# Patient Record
(demographics unavailable — no encounter records)

---

## 2024-10-22 NOTE — DISCUSSION/SUMMARY
[] : The components of the vaccine(s) to be administered today are listed in the plan of care. The disease(s) for which the vaccine(s) are intended to prevent and the risks have been discussed with the caretaker.  The risks are also included in the appropriate vaccination information statements which have been provided to the patient's caregiver.  The caregiver has given consent to vaccinate. [FreeTextEntry1] : 2 day old F here to establish care. Appropriate weight change since birth. Diet, elimination and safety appropriate.  Baby breast-feeding, supplemented with Enfamil formula.  PE-WNL. Growth parameters WNL. No jaundice.  TcB today @ 48 hol, 9.5, phototherapy threshold .  Hep B received. Passed hearing exam. Passed CCHD screen.  Maternal depression screen negative.   Plan: - AG/RC provided - F/U [place] Screen # [id number], G6PD - Start Poly-vi-sol or Vitamin D daily - RTC in 1 week for  follow up and weight check - RTC for 1 month for HCM -  rectal thermometer prescribed   Anticipatory Guidance and Routine Care If infant is increasingly yellow (jaundiced), fever >100.4F, changes in activity, voids or feeding, to seek immediate medical attention and go the ED. [place] Transition: back to sleep, daily routines, calming techniques [place] Care: emergency preparedness plan, frequent hand washing, avoid direct sun exposure, expect 6-8 wet diapers/day, umbilical care reviewed Nutritional Adequacy: breastfeeding (Vitamin D supplement), iron fortified formula (if not ), no solid foods, no honey Parental Well Being: baby blues, accept help, sleep when baby sleeps, unwanted advice Safety: car seat safety, smoke free environment, no shaking, keep water heater temperature maximum 120 degrees F, active smoke and carbon monoxide detectors, crib safety   Caretaker expressed understanding of the plan and agrees. No other concerns or questions today.

## 2024-10-22 NOTE — HISTORY OF PRESENT ILLNESS
[Breast milk] : breast milk [Normal] : Normal [In Bassinet/Crib] : sleeps in bassinet/crib [On back] : sleeps on back [No] : No cigarette smoke exposure [Carbon Monoxide Detectors] : Carbon monoxide detectors at home [Smoke Detectors] : Smoke detectors at home. [Hepatitis B Vaccine Given] : Hepatitis B vaccine given [NO] : No [Vitamins ___] : Patient takes no vitamins [Co-sleeping] : no co-sleeping [Exposure to electronic nicotine delivery system] : No exposure to electronic nicotine delivery system [FreeTextEntry1] : 2 day old F presenting for  first well visit.    Hospital Course - [facility] Term male infant born at 38 weeks via  to a 34 yo  mother. Apgars were 9 and 9 at 1 and 5 minutes respectively. Infant was AGA. Hepatitis B vaccine was given. Passed hearing B/L. TCB at 23.5hrs was 7.3, phototherapy threshold 12.3. Prenatal labs were as follows: HIV negative on 22, RPR negative on 22, HBsAg negative on 22, Intrapartum RPR NR on 22, Rubella immune and GBS positive with inadequate treatment. Baby was admitted to observation nursery and then downgraded to well baby nursery after minimum 24 hours monitoring for signs of GBS sepsis. Maternal blood type A+. Congenital heart disease screening was passed. Maternal Covid negative. [facility]  Screening #[id number].   Birth Weight: Birth Length: Head Circumference: Discharge Weight:   New Patient History FHx: mom (none), dad (none) SHx: lives at home with parents (), mother (job), father (job), sibs, no pets, no smokers, no guns at home

## 2024-10-24 NOTE — HISTORY OF PRESENT ILLNESS
[FreeTextEntry6] : here for bili check breastfeeding, not yet on vitamins otherwise acting at baseline

## 2024-10-26 NOTE — DISCUSSION/SUMMARY
[FreeTextEntry1] : 9d/oex 37wk F presenting for bili check.  TcB ____ @ ___ hol , serum confirmation threshold_____, PT _____ RTC for 1 week weight check  - Discussed with guardians triple feedings (breast, expressed milk, offer formula). Monitor urine output and stool color. Indirect sunlight exposure 10-15 minutes.  - Discussed STRICT precautions for seeking immediate medical attention including but not limited to fever of 100.4F or more, yellowing or increased yellowing of skin or eyes, redness, discharge or foul odor from umbilical stump, poor feeding, lethargy or decreased responsiveness, fast or labored breathing, less than 5 wet diapers daily, rash or any other concerning sign or symptom.  Caretaker verbalized understanding of the aforementioned plan above. Caregiver in agreement of the plan. All questions answered.

## 2024-10-26 NOTE — HISTORY OF PRESENT ILLNESS
[de-identified] : bili check [FreeTextEntry6] : 9d/o ex37 wk F presenting for bili check.  Born 10/17 @ 7:46am.  BF 20 min each side on demand usually about q 1-2 and vitamin.  Voiding and stooling at baseline.

## 2024-11-05 NOTE — HISTORY OF PRESENT ILLNESS
[FreeTextEntry6] : bili check today noticed a lump in her belly button area Good activity, hydration and po intake. Acting at baseline otherwise.

## 2024-11-05 NOTE — PHYSICAL EXAM
[Db: ____] : Db [unfilled] [Normal External Genitalia] : normal external genitalia [NL] : warm, clear [FreeTextEntry9] : umbilical granuloma

## 2024-11-05 NOTE — DISCUSSION/SUMMARY
[FreeTextEntry1] : rhett @ [facility], 13, PT 20.9, SBT 15 much improved jaundice and hyperbilirubinemia  Umbilical granuloma: Silver nitrate procedure completed and tolerated well. Reviewed with parents regarding safe post umbilical cord fall care and management  Caretaker expressed understanding of the plan and agrees. No other concerns or questions today.

## 2024-11-12 NOTE — DISCUSSION/SUMMARY
[FreeTextEntry1] : SIGRID  is here for follow up  weight check. Appropriate weight gain since birth.  screen result pending.    Plan - Follow up for 1 mo hcm - Continue routine care - Continue with PVS or vitamin D drops if breastfeeding   Discussed STRICT precautions for seeking immediate medical attention including but not limited to fever of 100.4F or more, yellowing or increased yellowing of skin or eyes, redness, discharge or foul odor from umbilical stump, poor feeding, lethargy or decreased responsiveness, fast or labored breathing, less than 5 wet diapers daily, rash or any other concerning sign or symptom.   Caretaker verbalized understanding of the aforementioned plan above. Caregiver in agreement of the plan. All questions answered.

## 2024-11-12 NOTE — PHYSICAL EXAM
[Db: ____] : Db [unfilled] [Normal External Genitalia] : normal external genitalia [NL] : warm, clear [FreeTextEntry5] : mild scleral icterus [FreeTextEntry9] : well healing umbilical stump

## 2024-11-12 NOTE — HISTORY OF PRESENT ILLNESS
[FreeTextEntry6] : follow up  weight check.   Breastfeeding on demand. Sleeping and voiding appropriately. Taking vitamins as directed.

## 2024-11-18 NOTE — HISTORY OF PRESENT ILLNESS
[Mother] : mother [Breast milk] : breast milk [Vitamins ___] : Patient takes [unfilled] vitamins daily [Normal] : Normal [In Bassinet/Crib] : sleeps in bassinet/crib [On back] : sleeps on back [Co-sleeping] : no co-sleeping [No] : No cigarette smoke exposure [Exposure to electronic nicotine delivery system] : No exposure to electronic nicotine delivery system [Rear facing car seat in back seat] : Rear facing car seat in back seat [Carbon Monoxide Detectors] : Carbon monoxide detectors at home [Smoke Detectors] : Smoke detectors at home. [FreeTextEntry7] : Doing well - no major hospitalizations or ED visits  - no fevers, doing well  [NO] : No

## 2024-11-18 NOTE — DISCUSSION/SUMMARY
[Normal Growth] : growth [Normal Development] : development  [No Elimination Concerns] : elimination [Continue Regimen] : feeding [No Skin Concerns] : skin [Normal Sleep Pattern] : sleep [None] : no medical problems [Anticipatory Guidance Given] : Anticipatory guidance addressed as per the history of present illness section [Parental Well-Being] : parental well-being [Family Adjustment] : family adjustment [Feeding Routines] : feeding routines [Infant Adjustment] : infant adjustment [Safety] : safety [No Medications] : ~He/She~ is not on any medications [Parent/Guardian] : Parent/Guardian [Parental Concerns Addressed] : Parental concerns addressed [de-identified] : Maternal paperwork for maternal brace

## 2024-11-18 NOTE — PHYSICAL EXAM
[Alert] : alert [Acute Distress] : no acute distress [Normocephalic] : normocephalic [Flat Open Anterior [place]] : flat open anterior fontanelle [PERRL] : PERRL [Red Reflex Bilateral] : red reflex bilateral [Normally Placed Ears] : normally placed ears [Auricles Well Formed] : auricles well formed [Clear Tympanic membranes] : clear tympanic membranes [Light reflex present] : light reflex present [Bony landmarks visible] : bony landmarks visible [Discharge] : no discharge [Nares Patent] : nares patent [Palate Intact] : palate intact [Uvula Midline] : uvula midline [Supple, full passive range of motion] : supple, full passive range of motion [Palpable Masses] : no palpable masses [Symmetric Chest Rise] : symmetric chest rise [Clear to Auscultation Bilaterally] : clear to auscultation bilaterally [Regular Rate and Rhythm] : regular rate and rhythm [S1, S2 present] : S1, S2 present [Murmurs] : no murmurs [+2 Femoral Pulses] : +2 femoral pulses [Soft] : soft [Tender] : nontender [Distended] : not distended [Bowel Sounds] : bowel sounds present [Hepatomegaly] : no hepatomegaly [Splenomegaly] : no splenomegaly [Normal external genitailia] : normal external genitalia [Clitoromegaly] : no clitoromegaly [Patent Vagina] : vagina patent [Normally Placed] : normally placed [No Abnormal Lymph Nodes Palpated] : no abnormal lymph nodes palpated [Turk-Ortolani] : negative Turk-Ortolani [Symmetric Flexed Extremities] : symmetric flexed extremities [Spinal Dimple] : no spinal dimple [Tuft of Hair] : no tuft of hair [Startle Reflex] : startle reflex present [Suck Reflex] : suck reflex present [Rooting] : rooting reflex present [Palmar Grasp] : palmar grasp reflex present [Plantar Grasp] : plantar grasp reflex present [Symmetric [place]] : symmetric [place] [Jaundice] : no jaundice [Rash and/or lesion present] : no rash/lesion [Dermal Melanocytosis] : Dermal Melanocytosis

## 2024-11-22 NOTE — HISTORY OF PRESENT ILLNESS
[FreeTextEntry6] : mother noticed baby felt warm and brought in Good activity, hydration and po intake. Acting at baseline otherwise. with nasal congestion and intermittent cough No vomiting, diarrhea, sob or difficulty breathing, joint pain or swelling, rash, urinary symptoms, ear pain or tugging, decreased wet diapers

## 2024-11-22 NOTE — DISCUSSION/SUMMARY
[FreeTextEntry1] : vitals obtained forehead temperature 100.5f history reviewed - SIGRID normally wears between 3-4 layers of clothing, not including wrap. mother felt SIGRID was warm on her chest and belly rectal temp obtained in office - 98.9f "fever" noticed by mother most likely due to over heating 2/2 multiple clothing layers, SIGRID is otherwise well, though with nasal congestion advised mother to buy rectal thermometer, instructions and how to obtain proper rectal temperature reviewed abnormal breathing patterns reviewed, educational video reviewed as well  Continue supportive care. ED precautions reviewed. RTC routine and prn. Caretaker expressed understanding of the plan and agrees. No other concerns or questions today.

## 2024-12-24 NOTE — DISCUSSION/SUMMARY
[Normal Growth] : growth [Normal Development] : development  [No Elimination Concerns] : elimination [Continue Regimen] : feeding [No Skin Concerns] : skin [Normal Sleep Pattern] : sleep [None] : no medical problems [Anticipatory Guidance Given] : Anticipatory guidance addressed as per the history of present illness section [Parental (Maternal) Well-Being] : parental (maternal) well-being [Infant-Family Synchrony] : infant-family synchrony [Nutritional Adequacy] : nutritional adequacy [Infant Behavior] : infant behavior [Safety] : safety [Age Approp Vaccines] : Age appropriate vaccines administered [No Medications] : ~He/She~ is not on any medications [Parent/Guardian] : Parent/Guardian [Parental Concerns Addressed] : Parental concerns addressed [de-identified] : - white scrapable plaque - advised mother to monitor, if unscrapable and/or getting worse, then rtc for oral candidiasis evaluation [] : The components of the vaccine(s) to be administered today are listed in the plan of care. The disease(s) for which the vaccine(s) are intended to prevent and the risks have been discussed with the caretaker.  The risks are also included in the appropriate vaccination information statements which have been provided to the patient's caregiver.  The caregiver has given consent to vaccinate.

## 2024-12-24 NOTE — PHYSICAL EXAM
[Alert] : alert [Acute Distress] : no acute distress [Normocephalic] : normocephalic [Flat Open Anterior [place]] : flat open anterior fontanelle [PERRL] : PERRL [Red Reflex Bilateral] : red reflex bilateral [Normally Placed Ears] : normally placed ears [Auricles Well Formed] : auricles well formed [Clear Tympanic membranes] : clear tympanic membranes [Light reflex present] : light reflex present [Bony landmarks visible] : bony landmarks visible [Discharge] : no discharge [Nares Patent] : nares patent [Palate Intact] : palate intact [Uvula Midline] : uvula midline [Supple, full passive range of motion] : supple, full passive range of motion [Palpable Masses] : no palpable masses [Symmetric Chest Rise] : symmetric chest rise [Clear to Auscultation Bilaterally] : clear to auscultation bilaterally [Regular Rate and Rhythm] : regular rate and rhythm [S1, S2 present] : S1, S2 present [Murmurs] : no murmurs [+2 Femoral Pulses] : +2 femoral pulses [Soft] : soft [Tender] : nontender [Distended] : not distended [Bowel Sounds] : bowel sounds present [Hepatomegaly] : no hepatomegaly [Splenomegaly] : no splenomegaly [Normal external genitailia] : normal external genitalia [Clitoromegaly] : no clitoromegaly [Patent Vagina] : vagina patent [Normally Placed] : normally placed [No Abnormal Lymph Nodes Palpated] : no abnormal lymph nodes palpated [Turk-Ortolani] : negative Turk-Ortolani [Symmetric Flexed Extremities] : symmetric flexed extremities [Spinal Dimple] : no spinal dimple [Tuft of Hair] : no tuft of hair [Startle Reflex] : startle reflex present [Suck Reflex] : suck reflex present [Rooting] : rooting reflex present [Palmar Grasp] : palmar grasp reflex present [Plantar Grasp] : plantar grasp reflex present [Symmetric [place]] : symmetric [place] [Rash and/or lesion present] : no rash/lesion [Dermal Melanocytosis] : Dermal Melanocytosis [de-identified] : white scrapable plaque

## 2024-12-24 NOTE — HISTORY OF PRESENT ILLNESS
[Mother] : mother [Breast milk] : breast milk [Vitamins ___] : Patient takes [unfilled] vitamins daily [Normal] : Normal [In Bassinet/Crib] : sleeps in bassinet/crib [On back] : sleeps on back [No] : No cigarette smoke exposure [Exposure to electronic nicotine delivery system] : No exposure to electronic nicotine delivery system [Rear facing car seat in back seat] : Rear facing car seat in back seat [Carbon Monoxide Detectors] : Carbon monoxide detectors at home [Smoke Detectors] : Smoke detectors at home. [FreeTextEntry7] : Doing well - no major hospitalizations or ED visits  [NO] : No

## 2025-02-24 NOTE — DISCUSSION/SUMMARY
[] : The components of the vaccine(s) to be administered today are listed in the plan of care. The disease(s) for which the vaccine(s) are intended to prevent and the risks have been discussed with the caretaker.  The risks are also included in the appropriate vaccination information statements which have been provided to the patient's caregiver.  The caregiver has given consent to vaccinate. [FreeTextEntry1] : 4 month F presenting for HCM. Education provided regarding introduction of solid foods, wait until 6 mo as breastfeeding, though can start cereals. Maternal depression screen negative.   Plan - Growth and development reviewed - Anticipatory guidance and routine care provided - RTC for 6 month HCM - Immunizations: PCV, Pentacel, Rota   Recommend breastfeeding, 8-12 feedings per day. Mother should continue prenatal vitamins and avoid alcohol. If formula is needed, recommend iron-fortified formulations, 2-4 oz every 3-4 hrs. Cereal may be introduced using a spoon and bowl. When in car, patient should be in rear-facing car seat in back seat. Put baby to sleep on back, in own crib with no loose or soft bedding. Lower crib mattress. Help baby to maintain sleep and feeding routines. May offer pacifier if needed. Continue tummy time when awake.   Caretaker expressed understanding of the plan and agrees. No other concerns or questions today.

## 2025-02-24 NOTE — HISTORY OF PRESENT ILLNESS
[Parents] : parents [Breast milk] : breast milk [Vitamins ___] : Patient takes [unfilled] vitamins daily [Normal] : Normal [In Bassinet/Crib] : sleeps in bassinet/crib [On back] : sleeps on back [Tummy time] : tummy time [No] : No cigarette smoke exposure [Exposure to electronic nicotine delivery system] : No exposure to electronic nicotine delivery system [Rear facing car seat in back seat] : Rear facing car seat in back seat [Carbon Monoxide Detectors] : Carbon monoxide detectors at home [Smoke Detectors] : Smoke detectors at home. [FreeTextEntry7] : Doing well - no major hospitalizations or ED visits  [NO] : No

## 2025-02-24 NOTE — PHYSICAL EXAM
[Alert] : alert [Acute Distress] : no acute distress [Normocephalic] : normocephalic [Flat Open Anterior [place]] : flat open anterior fontanelle [Red Reflex] : red reflex bilateral [PERRL] : PERRL [Normally Placed Ears] : normally placed ears [Auricles Well Formed] : auricles well formed [Clear Tympanic membranes] : clear tympanic membranes [Light reflex present] : light reflex present [Bony landmarks visible] : bony landmarks visible [Discharge] : no discharge [Nares Patent] : nares patent [Palate Intact] : palate intact [Uvula Midline] : uvula midline [Palpable Masses] : no palpable masses [Symmetric Chest Rise] : symmetric chest rise [Clear to Auscultation Bilaterally] : clear to auscultation bilaterally [Regular Rate and Rhythm] : regular rate and rhythm [S1, S2 present] : S1, S2 present [Murmurs] : no murmurs [+2 Femoral Pulses] : (+) 2 femoral pulses [Soft] : soft [Tender] : nontender [Distended] : nondistended [Bowel Sounds] : bowel sounds present [Hepatomegaly] : no hepatomegaly [Splenomegaly] : no splenomegaly [Normal External Genitalia] : normal external genitalia [Clitoromegaly] : no clitoromegaly [Normal Vaginal Introitus] : normal vaginal introitus [Patent] : patent [Normally Placed] : normally placed [No Abnormal Lymph Nodes Palpated] : no abnormal lymph nodes palpated [Turk-Ortolani] : negative Turk-Ortolani [Allis Sign] : negative Allis sign [Spinal Dimple] : no spinal dimple [Tuft of Hair] : no tuft of hair [Startle Reflex] : startle reflex present [Plantar Grasp] : plantar grasp reflex present [Symmetric Aub[place]] : symmetric [place] [Rash or Lesions] : no rash/lesions [Dermal Melanocytosis] : Dermal Melanocytosis

## 2025-04-18 NOTE — HISTORY OF PRESENT ILLNESS
Last office visit 12/19/17.  Last refill bupropion 7/26/18.  Rx eprescribed per protocol.   [Mother] : mother [Breast milk] : breast milk [Vitamins ___] : Patient takes [unfilled] vitamins daily [Normal] : Normal [In Bassinet/Crib] : sleeps in bassinet/crib [On back] : sleeps on back [Co-sleeping] : no co-sleeping [Tummy time] : tummy time [No] : No cigarette smoke exposure [Exposure to electronic nicotine delivery system] : No exposure to electronic nicotine delivery system [Rear facing car seat in back seat] : Rear facing car seat in back seat [Carbon Monoxide Detectors] : Carbon monoxide detectors at home [Smoke Detectors] : Smoke detectors at home. [de-identified] : Doing well - no major hospitalizations or ED visits  [de-identified] : giving a few oz of regular milk to try in the past few days  [NO] : No

## 2025-04-18 NOTE — DISCUSSION/SUMMARY
[Normal Growth] : growth [Normal Development] : development [None] : No medical problems [No Elimination Concerns] : elimination [No Feeding Concerns] : feeding [No Skin Concerns] : skin [Normal Sleep Pattern] : sleep [Add Food/Vitamin] : Add Food/Vitamin: [Cereal] : cereal [Fruits] : fruits [Vegetables] : vegetables [Water] : water [No Medications] : ~He/She~ is not on any medications [Parent/Guardian] : parent/guardian [Parental Concerns Addressed] : Parental concerns addressed [FreeTextEntry1] : peanut exposure [] : The components of the vaccine(s) to be administered today are listed in the plan of care. The disease(s) for which the vaccine(s) are intended to prevent and the risks have been discussed with the caretaker.  The risks are also included in the appropriate vaccination information statements which have been provided to the patient's caregiver.  The caregiver has given consent to vaccinate.

## 2025-04-18 NOTE — PHYSICAL EXAM
[Alert] : alert [Acute Distress] : no acute distress [Normocephalic] : normocephalic [Flat Open Anterior [place]] : flat open anterior fontanelle [Red Reflex] : red reflex bilateral [PERRL] : PERRL [Normally Placed Ears] : normally placed ears [Auricles Well Formed] : auricles well formed [Clear Tympanic membranes] : clear tympanic membranes [Light reflex present] : light reflex present [Bony landmarks visible] : bony landmarks visible [Discharge] : no discharge [Nares Patent] : nares patent [Palate Intact] : palate intact [Uvula Midline] : uvula midline [Tooth Eruption] : no tooth eruption [Supple, full passive range of motion] : supple, full passive range of motion [Palpable Masses] : no palpable masses [Symmetric Chest Rise] : symmetric chest rise [Clear to Auscultation Bilaterally] : clear to auscultation bilaterally [Regular Rate and Rhythm] : regular rate and rhythm [S1, S2 present] : S1, S2 present [Murmurs] : no murmurs [+2 Femoral Pulses] : (+) 2 femoral pulses [Soft] : soft [Tender] : nontender [Distended] : nondistended [Bowel Sounds] : bowel sounds present [Hepatomegaly] : no hepatomegaly [Splenomegaly] : no splenomegaly [Normal External Genitalia] : normal external genitalia [Clitoromegaly] : no clitoromegaly [Normal Vaginal Introitus] : normal vaginal introitus [Patent] : patent [Normally Placed] : normally placed [No Abnormal Lymph Nodes Palpated] : no abnormal lymph nodes palpated [Turk-Ortolani] : negative Turk-Ortolani [Allis Sign] : negative Allis sign [Symmetric Buttocks Creases] : symmetric buttocks creases [Spinal Dimple] : no spinal dimple [Tuft of Hair] : no tuft of hair [Plantar Grasp] : plantar grasp reflex present [Cranial Nerves Grossly Intact] : cranial nerves grossly intact [Rash or Lesions] : no rash/lesions [Dermal Melanocytosis] : Dermal Melanocytosis

## 2025-07-18 NOTE — HISTORY OF PRESENT ILLNESS
[Mother] : mother [Breast milk] : breast milk [Vitamin ___] : Patient takes [unfilled] vitamins daily [Fruit] : fruit [Vegetables] : vegetables [Cereal] : cereal [Meat] : no meat [Eggs] : no eggs [Fish] : no fish [Peanut] : no peanut [Water] : water [Normal] : Normal [In Crib] : sleeps in crib [On back] : sleeps on back [Brushing teeth] : not brushing teeth [None] : Primary Fluoride Source: None [No] : Not at  exposure [Rear facing car seat in  back seat] : Rear facing car seat in  back seat [Carbon Monoxide Detectors] : Carbon monoxide detectors [Smoke Detectors] : Smoke detectors [Up to date] : Up to date [FreeTextEntry7] : Doing well - no major hospitalizations or ED visits  [NO] : No

## 2025-07-18 NOTE — DISCUSSION/SUMMARY
[Normal Growth] : growth [Normal Development] : development [None] : No known medical problems [No Elimination Concerns] : elimination [No Feeding Concerns] : feeding [No Skin Concerns] : skin [Normal Sleep Pattern] : sleep [Family Adaptation] : family adaptation [Infant [place]] : infant independence [Feeding Routine] : feeding routine [Safety] : safety [No Medications] : ~He/She~ is not on any medications [Parent/Guardian] : parent/guardian

## 2025-07-18 NOTE — PHYSICAL EXAM
[Alert] : alert [Acute Distress] : no acute distress [Normocephalic] : normocephalic [Flat Open Anterior [place]] : flat open anterior fontanelle [Red Reflex] : red reflex bilateral [Excessive Tearing] : no excessive tearing [PERRL] : PERRL [Normally Placed Ears] : normally placed ears [Auricles Well Formed] : auricles well formed [Clear Tympanic membranes] : clear tympanic membranes [Light reflex present] : light reflex present [Bony landmarks visible] : bony landmarks visible [Discharge] : no discharge [Nares Patent] : nares patent [Palate Intact] : palate intact [Uvula Midline] : uvula midline [Supple, full passive range of motion] : supple, full passive range of motion [Palpable Masses] : no palpable masses [Symmetric Chest Rise] : symmetric chest rise [Clear to Auscultation Bilaterally] : clear to auscultation bilaterally [Regular Rate and Rhythm] : regular rate and rhythm [S1, S2 present] : S1, S2 present [Murmurs] : no murmurs [+2 Femoral Pulses] : (+) 2 femoral pulses [Soft] : soft [Tender] : nontender [Distended] : nondistended [Bowel Sounds] : bowel sounds present [Hepatomegaly] : no hepatomegaly [Splenomegaly] : no splenomegaly [Normal External Genitalia] : normal external genitalia [Clitoromegaly] : no clitoromegaly [No Abnormal Lymph Nodes Palpated] : no abnormal lymph nodes palpated [Symmetric abduction and rotation of hips] : symmetric abduction and rotation of hips [Allis Sign] : negative Allis sign [Straight] : straight [Cranial Nerves Grossly Intact] : cranial nerves grossly intact [Rash or Lesions] : no rash/lesions [Dermal Melanocytosis] : Dermal Melanocytosis